# Patient Record
Sex: FEMALE | Race: WHITE | NOT HISPANIC OR LATINO | Employment: UNEMPLOYED | URBAN - METROPOLITAN AREA
[De-identification: names, ages, dates, MRNs, and addresses within clinical notes are randomized per-mention and may not be internally consistent; named-entity substitution may affect disease eponyms.]

---

## 2023-07-15 PROBLEM — N95.0 POSTMENOPAUSAL BLEEDING: Status: ACTIVE | Noted: 2023-07-15

## 2023-07-16 PROBLEM — N85.8 UTERINE MASS: Status: ACTIVE | Noted: 2023-07-16

## 2023-10-11 ENCOUNTER — OFFICE VISIT (OUTPATIENT)
Dept: GYNECOLOGIC ONCOLOGY | Facility: CLINIC | Age: 78
End: 2023-10-11
Payer: MEDICARE

## 2023-10-11 VITALS
OXYGEN SATURATION: 98 % | DIASTOLIC BLOOD PRESSURE: 84 MMHG | TEMPERATURE: 97.3 F | RESPIRATION RATE: 18 BRPM | BODY MASS INDEX: 29.3 KG/M2 | HEIGHT: 62 IN | SYSTOLIC BLOOD PRESSURE: 164 MMHG | HEART RATE: 58 BPM | WEIGHT: 159.2 LBS

## 2023-10-11 DIAGNOSIS — N95.0 POSTMENOPAUSAL BLEEDING: ICD-10-CM

## 2023-10-11 DIAGNOSIS — D25.0 INTRAMURAL, SUBMUCOUS, AND SUBSEROUS LEIOMYOMA OF UTERUS: Primary | ICD-10-CM

## 2023-10-11 DIAGNOSIS — D25.1 INTRAMURAL, SUBMUCOUS, AND SUBSEROUS LEIOMYOMA OF UTERUS: Primary | ICD-10-CM

## 2023-10-11 DIAGNOSIS — N85.8 UTERINE MASS: ICD-10-CM

## 2023-10-11 DIAGNOSIS — R35.0 URINARY FREQUENCY: ICD-10-CM

## 2023-10-11 DIAGNOSIS — D25.2 INTRAMURAL, SUBMUCOUS, AND SUBSEROUS LEIOMYOMA OF UTERUS: Primary | ICD-10-CM

## 2023-10-11 LAB
BACTERIA UR QL AUTO: ABNORMAL /HPF
BILIRUB UR QL STRIP: NEGATIVE
CLARITY UR: CLEAR
COLOR UR: YELLOW
GLUCOSE UR STRIP-MCNC: NEGATIVE MG/DL
HGB UR QL STRIP.AUTO: ABNORMAL
HYALINE CASTS UR QL AUTO: ABNORMAL /LPF
KETONES UR QL STRIP: NEGATIVE
LEUKOCYTE ESTERASE UR QL STRIP.AUTO: NEGATIVE
NITRITE UR QL STRIP: NEGATIVE
PH UR STRIP.AUTO: 6 [PH] (ref 5–8)
PROT UR QL STRIP: NEGATIVE
RBC # UR STRIP: ABNORMAL /HPF
REF LAB TEST METHOD: ABNORMAL
SP GR UR STRIP: 1.01 (ref 1–1.03)
SQUAMOUS #/AREA URNS HPF: ABNORMAL /HPF
UROBILINOGEN UR QL STRIP: ABNORMAL
WBC # UR STRIP: ABNORMAL /HPF

## 2023-10-11 PROCEDURE — 99213 OFFICE O/P EST LOW 20 MIN: CPT | Performed by: OBSTETRICS & GYNECOLOGY

## 2023-10-11 PROCEDURE — 1160F RVW MEDS BY RX/DR IN RCRD: CPT | Performed by: OBSTETRICS & GYNECOLOGY

## 2023-10-11 PROCEDURE — 1126F AMNT PAIN NOTED NONE PRSNT: CPT | Performed by: OBSTETRICS & GYNECOLOGY

## 2023-10-11 PROCEDURE — 1159F MED LIST DOCD IN RCRD: CPT | Performed by: OBSTETRICS & GYNECOLOGY

## 2023-10-11 PROCEDURE — 81001 URINALYSIS AUTO W/SCOPE: CPT | Performed by: OBSTETRICS & GYNECOLOGY

## 2023-10-12 ENCOUNTER — TELEPHONE (OUTPATIENT)
Dept: GYNECOLOGIC ONCOLOGY | Facility: CLINIC | Age: 78
End: 2023-10-12
Payer: MEDICARE

## 2023-11-17 ENCOUNTER — TELEPHONE (OUTPATIENT)
Dept: OBSTETRICS AND GYNECOLOGY | Facility: CLINIC | Age: 78
End: 2023-11-17
Payer: MEDICARE

## 2023-11-20 ENCOUNTER — OFFICE VISIT (OUTPATIENT)
Dept: OBSTETRICS AND GYNECOLOGY | Facility: CLINIC | Age: 78
End: 2023-11-20
Payer: OTHER GOVERNMENT

## 2023-11-20 VITALS
WEIGHT: 156 LBS | DIASTOLIC BLOOD PRESSURE: 80 MMHG | SYSTOLIC BLOOD PRESSURE: 140 MMHG | HEIGHT: 62 IN | BODY MASS INDEX: 28.71 KG/M2

## 2023-11-20 DIAGNOSIS — Z91.89 AT RISK FOR OSTEOPENIA DUE TO HISTORY OF OSTEOPOROSIS: Primary | ICD-10-CM

## 2023-11-20 DIAGNOSIS — Z92.29 HISTORY OF POSTMENOPAUSAL HRT: ICD-10-CM

## 2023-11-20 DIAGNOSIS — Z87.42 HISTORY OF POSTMENOPAUSAL BLEEDING: ICD-10-CM

## 2025-01-08 ENCOUNTER — OFFICE VISIT (OUTPATIENT)
Dept: OBSTETRICS AND GYNECOLOGY | Facility: CLINIC | Age: 80
End: 2025-01-08
Payer: OTHER GOVERNMENT

## 2025-01-08 VITALS
WEIGHT: 148 LBS | DIASTOLIC BLOOD PRESSURE: 72 MMHG | HEIGHT: 62 IN | SYSTOLIC BLOOD PRESSURE: 136 MMHG | BODY MASS INDEX: 27.23 KG/M2

## 2025-01-08 DIAGNOSIS — K43.2 VENTRAL HERNIA, RECURRENT: Primary | ICD-10-CM

## 2025-01-08 DIAGNOSIS — R87.615 ENCOUNTER FOR REPEAT PAP SMEAR DUE TO PREVIOUS INSUFFICIENT CERVICAL CELLS: ICD-10-CM

## 2025-01-08 DIAGNOSIS — K43.2 VENTRAL HERNIA, RECURRENT: ICD-10-CM

## 2025-01-08 DIAGNOSIS — N95.2 ATROPHIC VAGINITIS: ICD-10-CM

## 2025-01-08 DIAGNOSIS — N39.41 URGE INCONTINENCE: ICD-10-CM

## 2025-01-08 DIAGNOSIS — Z01.419 ENCOUNTER FOR ANNUAL ROUTINE GYNECOLOGICAL EXAMINATION: Primary | ICD-10-CM

## 2025-01-08 DIAGNOSIS — Z12.31 ENCOUNTER FOR SCREENING MAMMOGRAM FOR BREAST CANCER: ICD-10-CM

## 2025-01-08 RX ORDER — ACETAMINOPHEN 500 MG
500 TABLET ORAL EVERY 6 HOURS PRN
COMMUNITY

## 2025-01-08 RX ORDER — AMLODIPINE BESYLATE 5 MG/1
TABLET ORAL
COMMUNITY
Start: 2024-12-17

## 2025-01-08 NOTE — PROGRESS NOTES
"     Gynecologic Annual Exam Note        GYN Annual Exam     Chief Complaint   Patient presents with    Gynecologic Exam        Subjective     HPI  Luisito Harris is a 79 y.o. female, , who presents for annual well woman exam as a(n) established patient.  She is postmenopausal.  Patient denies vaginal bleeding.  Since her last visit the patient underwent surgery for left hip replacement 10/2024 and diagnosed with hypertension 2024. Tremor in left hand has improved since medication change for hypertension; neurology appointment scheduled for February. Marital Status: . She is sexually active. She has not had new partners. STD testing recommendations have been explained to the patient and she declines STD testing.    The patient would like to discuss the following complaints today: none; wanting to follow up on \"cyst\" near the cervix that was found last time  Patient reports mild urge urinary incontinence; denies stress urinary incontinence.     Additional OB/GYN History     On HRT? No    Last Pap: 2023. Results: non-diagnostic . HPV: negative.     Last Completed Pap Smear       This patient has no relevant Health Maintenance data.          History of abnormal Pap smear: no  Family history of uterine, colon, breast, or ovarian cancer: no  Performs monthly Self-Breast Exam: yes  Last mammogram: . Done at [place]. There is not a copy in the chart.    Last Completed Mammogram            Ordered - MAMMOGRAM (Yearly) Ordered on 2022  MAMMO SCREENING BILATERAL W CAD    10/26/2020  MAMMO SCREENING BILATERAL W CAD                  Last colonoscopy: has had a cologuard 7 months ago    Last Completed Colonoscopy            Discontinued - COLORECTAL CANCER SCREENING  Discontinued        Frequency changed to Never automatically (Topic No Longer Applies)    2024  Cologuard component of Cologuard - ,                    Her last bone density scan was today and results were normal " "spine; history of bilateral hip replacement.  Exercises Regularly: yes - PT twice weekly, considering post-op status  Feelings of Anxiety or Depression: yes - anxiety (situational) - daughter getting a divorce      Tobacco Usage?: No       Current Outpatient Medications:     acetaminophen (TYLENOL) 500 MG tablet, Take 1 tablet by mouth Every 6 (Six) Hours As Needed for Mild Pain., Disp: , Rfl:     amLODIPine (NORVASC) 5 MG tablet, , Disp: , Rfl:     aspirin 81 MG chewable tablet, Chew 1 tablet Daily., Disp: , Rfl:     calcium carbonate (OS-YAQUELIN) 600 MG tablet, Take 1 tablet by mouth Daily., Disp: , Rfl:     MegaRed Omega-3 Krill Oil 500 MG capsule, Take 1 capsule by mouth Daily., Disp: , Rfl:     Multiple Vitamins-Minerals (CENTRUM ADULT PO), , Disp: , Rfl:     multivitamin with minerals (PRESERVISION AREDS PO), Take 1 tablet by mouth Daily., Disp: , Rfl:     vitamin D3 125 MCG (5000 UT) capsule capsule, Take 1 capsule by mouth Daily., Disp: , Rfl:     Patient denies the need for medication refills today.    OB History          3    Para   3    Term   0       0    AB   0    Living   3         SAB   0    IAB   0    Ectopic   0    Molar   0    Multiple   0    Live Births   3                Past Medical History:   Diagnosis Date    History of cervicitis 10/2023    History of postmenopausal bleeding 10/2023    \"bleeding noted to be coming from surface of cervix. Bleeding stopped with pressure and silver nitrate\"    Hyperlipidemia     Hypertension 2024    Subserosal leiomyoma of uterus 10/11/2023    Tremor of left hand     MRI normal 2024    Uterine fibroids         Past Surgical History:   Procedure Laterality Date    APPENDECTOMY      TOTAL HIP ARTHROPLASTY Bilateral     left hip 10/2024; right hip     WISDOM TOOTH EXTRACTION         Health Maintenance   Topic Date Due    LIPID PANEL  Never done    TDAP/TD VACCINES (1 - Tdap) Never done    ZOSTER VACCINE (2 of 3) 10/27/2014    DXA SCAN  " "01/15/2022    RSV Vaccine - Adults (1 - 1-dose 75+ series) Never done    HEPATITIS C SCREENING  Never done    ANNUAL WELLNESS VISIT  Never done    INFLUENZA VACCINE  Never done    COVID-19 Vaccine (4 - 2024-25 season) 09/01/2024    MAMMOGRAM  01/08/2025    Pneumococcal Vaccine 65+  Completed    COLORECTAL CANCER SCREENING  Discontinued       The additional following portions of the patient's history were reviewed and updated as appropriate: allergies, current medications, past family history, past medical history, past social history, past surgical history, and problem list.    Review of Systems      I have reviewed and agree with the HPI, ROS, and historical information as entered above. Vladimir Benavides MD          Objective   /72 (BP Location: Right arm, Patient Position: Sitting, Cuff Size: Adult)   Ht 156.2 cm (61.5\")   Wt 67.1 kg (148 lb)   BMI 27.51 kg/m²     Physical Exam  Vitals and nursing note reviewed. Exam conducted with a chaperone present.   Constitutional:       General: She is not in acute distress.     Appearance: Normal appearance.   HENT:      Head: Normocephalic and atraumatic.   Pulmonary:      Effort: Pulmonary effort is normal.   Chest:   Breasts:     Right: Normal. No mass, nipple discharge or skin change.      Left: Normal. No mass, nipple discharge or skin change.   Abdominal:      General: A surgical scar is present. There is no distension.      Palpations: Abdomen is soft. There is mass (3-4cm mobile firm nontender mass in RLQ, no enlargement with valsalva).      Tenderness: There is no abdominal tenderness.      Hernia: A hernia is present. Hernia is present in the ventral area (3-4cm mobile firm nontender mass in RLQ, no enlargement with valsalva). There is no hernia in the left inguinal area or right inguinal area.   Genitourinary:     General: Normal vulva.      Exam position: Lithotomy position.      Labia:         Right: No lesion.         Left: No lesion.       Urethra: No " prolapse or urethral lesion.      Vagina: Normal. No vaginal discharge.      Cervix: Normal. Friability present. No cervical motion tenderness or lesion.      Uterus: Normal.       Adnexa: Right adnexa normal and left adnexa normal.        Right: No tenderness or fullness.          Left: No tenderness or fullness.        Rectum: No external hemorrhoid.      Comments: Severe vaginal atrophy  Musculoskeletal:         General: Normal range of motion.   Lymphadenopathy:      Upper Body:      Right upper body: No axillary adenopathy.      Left upper body: No axillary adenopathy.   Neurological:      General: No focal deficit present.      Mental Status: She is alert.            Assessment and Plan    Problem List Items Addressed This Visit    None  Visit Diagnoses       Encounter for annual routine gynecological examination    -  Primary    Encounter for repeat Pap smear due to previous insufficient cervical cells        Relevant Orders    LIQUID-BASED PAP SMEAR, [facility] ([facility])    Urge incontinence        Encounter for screening mammogram for breast cancer        Relevant Orders    Mammo Screening Digital Tomosynthesis Bilateral With CAD    Ventral hernia, recurrent        Relevant Orders    CT Abdomen Pelvis With Contrast            GYN annual well woman exam.   Reviewed monthly self breast exams.  Instructed to call with lumps, pain, or breast discharge.  Yearly mammograms ordered.  Ordered mammogram today.  Reviewed exercise as a preventative health measures.   Colonoscopy recommended.  If low to average risk, cologuard risks/benefits discussed as an option as well.  RTC in 1 year or PRN with problems.  Friability noted from cervix today, prior notes from Dr. Godinez reviewed.  Suspect atrophic vaginitis.  Due to the prior non diagnostic Pap, Pap collected today  Offered vaginal estrogen for atrophic vaginitis, but patient does not have any symptoms.  Will defer for now  Mass palpable in anterior abdominal  wall.  Prior CT imaging personally reviewed.  Will obtain repeat CT imaging to ensure stability and no bowel.  The patient inquired about elective surgery but we decided that is probably not indicated at this time if she develops any symptoms, happy to refer to general surgery  Return in about 1 year (around 1/8/2026) for Annual exam.    Vladimir Benavides MD  01/08/2025

## 2025-01-09 LAB — REF LAB TEST METHOD: NORMAL

## 2025-01-22 ENCOUNTER — TELEPHONE (OUTPATIENT)
Dept: OBSTETRICS AND GYNECOLOGY | Facility: CLINIC | Age: 80
End: 2025-01-22
Payer: OTHER GOVERNMENT

## 2025-01-22 ENCOUNTER — HOSPITAL ENCOUNTER (OUTPATIENT)
Dept: CT IMAGING | Facility: HOSPITAL | Age: 80
Discharge: HOME | End: 2025-01-22
Admitting: OBSTETRICS & GYNECOLOGY
Payer: MEDICARE

## 2025-01-22 DIAGNOSIS — K43.2 VENTRAL HERNIA, RECURRENT: ICD-10-CM

## 2025-01-22 NOTE — TELEPHONE ENCOUNTER
Provider: DR. LIND    Caller: Luisito Harris    Relationship to Patient: Self    [phone]    Phone Number: [phone]    Reason for Call: PT WENT TO [facility] TO DO HER CT SCAN THAT DR. 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 THEY ADV HER THAT PERHAPS DR. LIND CAN PRESCRIBE PREDISONE AND ANOTHER MED AND THAT SHE WOULD HAVE TO HAVE THE SCAN DONE AT THE [facility].    When was the patient last seen: 01-08-25

## 2025-01-22 NOTE — TELEPHONE ENCOUNTER
Kennedy pt. [facility] would not perform pt CT scan due to her shellfish allergy. Told pt that she could take medication to prevent reaction and come back to have scan. Message sent to Dr. Benavides.

## 2025-01-22 NOTE — TELEPHONE ENCOUNTER
Pt called early to inform Dr. Benavides her CT scan was canceled by [facility] due to shellfish allergy. Per Dr. Benavides: Premeds sent in for patient.  A steroid pill she takes 13 hours, 7 hours, and 1 hour before the scan. As well as a benadryl to take 1 hour before the scan. The scan needs to be scheduled at the hospital campus. She should call [phone] to schedule. Left voicemail for pt to call office.

## 2025-02-04 ENCOUNTER — HOSPITAL ENCOUNTER (OUTPATIENT)
Dept: CT IMAGING | Facility: HOSPITAL | Age: 80
Discharge: HOME | End: 2025-02-04
Admitting: OBSTETRICS & GYNECOLOGY
Payer: MEDICARE

## 2025-02-04 PROCEDURE — 25510000001 IOPAMIDOL 61 % SOLUTION: Performed by: OBSTETRICS & GYNECOLOGY

## 2025-02-04 PROCEDURE — 74177 CT ABD & PELVIS W/CONTRAST: CPT

## 2025-02-04 RX ORDER — IOPAMIDOL 612 MG/ML
85 INJECTION, SOLUTION INTRAVASCULAR
Status: COMPLETED | OUTPATIENT
Start: 2025-02-04 | End: 2025-02-04

## 2025-02-04 RX ADMIN — IOPAMIDOL 85 ML: 612 INJECTION, SOLUTION INTRAVENOUS at 11:34
